# Patient Record
Sex: FEMALE | Race: WHITE
[De-identification: names, ages, dates, MRNs, and addresses within clinical notes are randomized per-mention and may not be internally consistent; named-entity substitution may affect disease eponyms.]

---

## 2019-01-18 ENCOUNTER — HOSPITAL ENCOUNTER (EMERGENCY)
Dept: HOSPITAL 89 - ER | Age: 34
Discharge: HOME | End: 2019-01-18
Payer: MEDICAID

## 2019-01-18 VITALS — SYSTOLIC BLOOD PRESSURE: 97 MMHG | DIASTOLIC BLOOD PRESSURE: 69 MMHG

## 2019-01-18 DIAGNOSIS — R94.5: ICD-10-CM

## 2019-01-18 DIAGNOSIS — R19.7: ICD-10-CM

## 2019-01-18 DIAGNOSIS — R10.11: Primary | ICD-10-CM

## 2019-01-18 DIAGNOSIS — R10.31: ICD-10-CM

## 2019-01-18 LAB — PLATELET COUNT, AUTOMATED: 428 K/UL (ref 150–450)

## 2019-01-18 PROCEDURE — 84703 CHORIONIC GONADOTROPIN ASSAY: CPT

## 2019-01-18 PROCEDURE — 82374 ASSAY BLOOD CARBON DIOXIDE: CPT

## 2019-01-18 PROCEDURE — 96374 THER/PROPH/DIAG INJ IV PUSH: CPT

## 2019-01-18 PROCEDURE — 82565 ASSAY OF CREATININE: CPT

## 2019-01-18 PROCEDURE — 81001 URINALYSIS AUTO W/SCOPE: CPT

## 2019-01-18 PROCEDURE — 83690 ASSAY OF LIPASE: CPT

## 2019-01-18 PROCEDURE — 82247 BILIRUBIN TOTAL: CPT

## 2019-01-18 PROCEDURE — 84075 ASSAY ALKALINE PHOSPHATASE: CPT

## 2019-01-18 PROCEDURE — 76705 ECHO EXAM OF ABDOMEN: CPT

## 2019-01-18 PROCEDURE — 96375 TX/PRO/DX INJ NEW DRUG ADDON: CPT

## 2019-01-18 PROCEDURE — 82040 ASSAY OF SERUM ALBUMIN: CPT

## 2019-01-18 PROCEDURE — 85025 COMPLETE CBC W/AUTO DIFF WBC: CPT

## 2019-01-18 PROCEDURE — 84520 ASSAY OF UREA NITROGEN: CPT

## 2019-01-18 PROCEDURE — 74177 CT ABD & PELVIS W/CONTRAST: CPT

## 2019-01-18 PROCEDURE — 82150 ASSAY OF AMYLASE: CPT

## 2019-01-18 PROCEDURE — 84155 ASSAY OF PROTEIN SERUM: CPT

## 2019-01-18 PROCEDURE — 82310 ASSAY OF CALCIUM: CPT

## 2019-01-18 PROCEDURE — 84460 ALANINE AMINO (ALT) (SGPT): CPT

## 2019-01-18 PROCEDURE — 84295 ASSAY OF SERUM SODIUM: CPT

## 2019-01-18 PROCEDURE — 84132 ASSAY OF SERUM POTASSIUM: CPT

## 2019-01-18 PROCEDURE — 99284 EMERGENCY DEPT VISIT MOD MDM: CPT

## 2019-01-18 PROCEDURE — 83605 ASSAY OF LACTIC ACID: CPT

## 2019-01-18 PROCEDURE — 96361 HYDRATE IV INFUSION ADD-ON: CPT

## 2019-01-18 PROCEDURE — 82435 ASSAY OF BLOOD CHLORIDE: CPT

## 2019-01-18 PROCEDURE — 84450 TRANSFERASE (AST) (SGOT): CPT

## 2019-01-18 PROCEDURE — 82947 ASSAY GLUCOSE BLOOD QUANT: CPT

## 2019-01-18 NOTE — ER REPORT
History and Physical


Time Seen By MD:  18:50


Hx. of Stated Complaint:  


ABD PRESSURE X 2 DAYS, TODAY VOMITING. PT REPORTS SEEING MD IN [place] AND 


TOLD THAT HER APPENDIX IS "ENLARGED" AND TOLD TO "WATCH IT"


HPI/ROS


CHIEF COMPLAINT: Abdominal pain





HISTORY OF PRESENT ILLNESS: 33 year-old female presents to the ER complaining of


severe right upper quadrant and right lower quadrant abdominal pain.  Patient 


was seen in the ER and weak in 2 days ago.  States she had a CAT scan to 


evaluate her right lower abdominal pain.  She states that she had an enlarged 


appendix.  She was advised a clear liquid diet.  Patient notes that her pain is 


worse.  She's been having some fever and chills.  She's had no diarrhea, but 


notes severe nausea and no vomiting.  Patient denies dysuria.  Patient has a 


history of a .





REVIEW OF SYSTEMS:


Respiratory: No cough, no dyspnea.


Cardiovascular: No chest pain, no palpitations.


Gastrointestinal: As above


Musculoskeletal: No back pain.


Allergies:  


Coded Allergies:  


     mustard (Verified  Allergy, Mild, 19)


     venom-wasp (Verified  Allergy, Mild, 19)


Uncoded Allergies:  


     BEE STINGS (Allergy, Mild, 3/24/10)


Home Meds


Active Scripts


Promethazine Hcl (PROMETHAZINE HCL) 25 Mg Tablet, 25 MG PO Q4H PRN for 


NAUSEA/VOMITING, #14 TAB


   Prov:NITO PUCKETT DO         19


Tramadol Hcl (TRAMADOL HCL) 50 Mg Tablet, 1 TAB PO Q4-6H for PAIN, #12 TAB


   Prov:NITO PUCKETT DO         19


Reported Medications


Tramadol Hcl (Ultram) 50 Mg Tab, 50 MG PO Q4-6H, 0 Refills


   12


Hydrocodone Bit/Acetaminophen (Hydrocodone/Apap 7.5/500 Tb) 1 Each Tablet, 1 


EACH PO Q4-6H, 0 Refills


   12


Ibuprofen (Motrin) 400 Mg Tablet, 400 MG PO, #20 0 Refills


   12


[vitamins]   No Conflict Check, 0 Refills


   12


Reviewed Nurses Notes:  Yes


Old Medical Records Reviewed:  Yes


Hx Smoking:  No


Hx Substance Use Disorder:  No


Hx Alcohol Use:  No


Constitutional





Vital Sign - Last 24 Hours








 19





 18:30 19:00 19:01 19:16


 


Pulse 80  99 74


 


Resp 16   


 


B/P (MAP) 132/102 124/95 (105)  


 


Pulse Ox 98  96 92


 


O2 Delivery Room Air   





 19





 19:30 19:31 20:00 20:01


 


Pulse  72  81


 


B/P (MAP) 113/69 (84)  104/75 (85) 


 


Pulse Ox  90  90





 19





 20:06 20:30 20:36 20:51


 


Pulse 73  69 67


 


B/P (MAP)  103/62 (76)  


 


Pulse Ox 94  93 94





 19





 21:00 21:06 21:21 21:30


 


Pulse  70 80 


 


B/P (MAP) 114/57 (76)   96/64 (75)


 


Pulse Ox  92 92 





 19





 21:36 21:51 22:00 22:05


 


Pulse 66 62  70


 


B/P (MAP)   97/69 (78) 


 


Pulse Ox 93 88  91








Physical Exam


  Vital signs stable, pulse ox normal


General Appearance: The patient is alert, has no immediate need for airway 


protection and no current signs of toxicity.  Mild distress, slightly pale 


appearing


HEENT: Pupils equal and round no injection.  TMs normal, oropharynx are redness 


or exudate, mucous members are moist


Respiratory: Chest is non tender, lungs are clear to auscultation.


Cardiac: regular rate and rhythm


Gastrointestinal: Abdomen is soft, moderate tenderness in the right upper and 


right lower quadrant, there is some guarding in the right lower quadrant, no 


masses, bowel sounds normal.


Musculoskeletal:  Neck: Neck is supple and non tender.


Extremities have full range of motion and are non tender.


Skin: No rashes or lesions.





DIFFERENTIAL DIAGNOSIS: After history and physical exam differential diagnosis 


was considered for abdominal pain including but not limited to appendicitis, 


cholecystitis, gastritis and urinary tract infection.





Medical Decision Making


Data Points


Result Diagram:  


19





Laboratory





Hematology








Test


 19


18:52 19


19:00


 


Urine Color Yellow  


 


Urine Clarity Clear  


 


Urine pH


 5.0 pH


(4.8-9.5) 





 


Urine Specific Gravity 1.018  


 


Urine Protein


 Negative mg/dL


(NEGATIVE) 





 


Urine Glucose (UA)


 Negative mg/dL


(NEGATIVE) 





 


Urine Ketones


 Negative mg/dL


(NEGATIVE) 





 


Urine Blood


 Negative


(NEGATIVE) 





 


Urine Nitrite


 Negative


(NEGATIVE) 





 


Urine Bilirubin


 Negative


(NEGATIVE) 





 


Urine Urobilinogen


 Negative mg/dL


(0.2-1.9) 





 


Urine Leukocyte Esterase


 Negative


(NEGATIVE) 





 


Urine RBC


 1 /HPF


(0-2/HPF) 





 


Urine WBC


 <1 /HPF


(0-5/HPF) 





 


Urine Squamous Epithelial


Cells None /LPF


(</=FEW) 





 


Urine Bacteria


 Negative /HPF


(NONE-FEW) 





 


Urine Mucus


 None /HPF


(NONE-FEW) 





 


Red Blood Count


 


 4.63 M/uL


(4.17-5.56)


 


Mean Corpuscular Volume


 


 91.5 fL


(80.0-96.0)


 


Mean Corpuscular Hemoglobin


 


 31.4 pg


(26.0-33.0)


 


Mean Corpuscular Hemoglobin


Concent 


 34.3 g/dL


(32.0-36.0)


 


Red Cell Distribution Width


 


 17.7 %


(11.5-14.5)


 


Mean Platelet Volume


 


 7.8 fL


(7.2-11.1)


 


Neutrophils (%) (Auto)


 


 49.5 %


(39.4-72.5)


 


Lymphocytes (%) (Auto)


 


 40.6 %


(17.6-49.6)


 


Monocytes (%) (Auto)


 


 7.2 %


(4.1-12.4)


 


Eosinophils (%) (Auto)


 


 0.9 %


(0.4-6.7)


 


Basophils (%) (Auto)


 


 1.8 %


(0.3-1.4)


 


Nucleated RBC Relative Count


(auto) 


 0.4 /100WBC 





 


Neutrophils # (Auto)


 


 5.2 K/uL


(2.0-7.4)


 


Lymphocytes # (Auto)


 


 4.3 K/uL


(1.3-3.6)


 


Monocytes # (Auto)


 


 0.8 K/uL


(0.3-1.0)


 


Eosinophils # (Auto)


 


 0.1 K/uL


(0.0-0.5)


 


Basophils # (Auto)


 


 0.2 K/uL


(0.0-0.1)


 


Nucleated RBC Absolute Count


(auto) 


 0.04 K/uL 





 


Sodium Level


 


 138 mmol/L


(137-145)


 


Potassium Level


 


 4.0 mmol/L


(3.5-5.0)


 


Chloride Level


 


 102 mmol/L


()


 


Carbon Dioxide Level


 


 23 mmol/L


(22-31)


 


Blood Urea Nitrogen


 


 13 mg/dl


(7-18)


 


Creatinine


 


 0.70 mg/dl


(0.52-1.04)


 


Glomerular Filtration Rate


Calc 


 > 60.0 





 


Random Glucose


 


 95 mg/dl


()


 


Lactate


 


 1.8 mmol/L


(0.7-2.1)


 


Calcium Level


 


 9.6 mg/dl


(8.4-10.2)


 


Total Bilirubin


 


 0.9 mg/dl


(0.2-1.3)


 


Aspartate Amino Transf


(AST/SGOT) 


 104 U/L (0-35) 





 


Alanine Aminotransferase


(ALT/SGPT) 


 213 U/L (0-56) 





 


Alkaline Phosphatase


 


 130 U/L


(0-126)


 


Total Protein


 


 8.5 g/dl


(6.3-8.2)


 


Albumin


 


 4.6 g/dl


(3.5-5.0)


 


Amylase Level  62 U/L (0-110) 


 


Lipase


 


 177 U/L


()


 


Human Chorionic Gonadotropin,


Qual 


 Negative


(NEGATIVE)








Chemistry








Test


 19


18:52 19


19:00


 


Urine Color Yellow  


 


Urine Clarity Clear  


 


Urine pH


 5.0 pH


(4.8-9.5) 





 


Urine Specific Gravity 1.018  


 


Urine Protein


 Negative mg/dL


(NEGATIVE) 





 


Urine Glucose (UA)


 Negative mg/dL


(NEGATIVE) 





 


Urine Ketones


 Negative mg/dL


(NEGATIVE) 





 


Urine Blood


 Negative


(NEGATIVE) 





 


Urine Nitrite


 Negative


(NEGATIVE) 





 


Urine Bilirubin


 Negative


(NEGATIVE) 





 


Urine Urobilinogen


 Negative mg/dL


(0.2-1.9) 





 


Urine Leukocyte Esterase


 Negative


(NEGATIVE) 





 


Urine RBC


 1 /HPF


(0-2/HPF) 





 


Urine WBC


 <1 /HPF


(0-5/HPF) 





 


Urine Squamous Epithelial


Cells None /LPF


(</=FEW) 





 


Urine Bacteria


 Negative /HPF


(NONE-FEW) 





 


Urine Mucus


 None /HPF


(NONE-FEW) 





 


White Blood Count


 


 10.5 k/uL


(4.5-11.0)


 


Red Blood Count


 


 4.63 M/uL


(4.17-5.56)


 


Hemoglobin


 


 14.5 g/dL


(12.0-16.0)


 


Hematocrit


 


 42.3 %


(34.0-47.0)


 


Mean Corpuscular Volume


 


 91.5 fL


(80.0-96.0)


 


Mean Corpuscular Hemoglobin


 


 31.4 pg


(26.0-33.0)


 


Mean Corpuscular Hemoglobin


Concent 


 34.3 g/dL


(32.0-36.0)


 


Red Cell Distribution Width


 


 17.7 %


(11.5-14.5)


 


Platelet Count


 


 428 K/uL


(150-450)


 


Mean Platelet Volume


 


 7.8 fL


(7.2-11.1)


 


Neutrophils (%) (Auto)


 


 49.5 %


(39.4-72.5)


 


Lymphocytes (%) (Auto)


 


 40.6 %


(17.6-49.6)


 


Monocytes (%) (Auto)


 


 7.2 %


(4.1-12.4)


 


Eosinophils (%) (Auto)


 


 0.9 %


(0.4-6.7)


 


Basophils (%) (Auto)


 


 1.8 %


(0.3-1.4)


 


Nucleated RBC Relative Count


(auto) 


 0.4 /100WBC 





 


Neutrophils # (Auto)


 


 5.2 K/uL


(2.0-7.4)


 


Lymphocytes # (Auto)


 


 4.3 K/uL


(1.3-3.6)


 


Monocytes # (Auto)


 


 0.8 K/uL


(0.3-1.0)


 


Eosinophils # (Auto)


 


 0.1 K/uL


(0.0-0.5)


 


Basophils # (Auto)


 


 0.2 K/uL


(0.0-0.1)


 


Nucleated RBC Absolute Count


(auto) 


 0.04 K/uL 





 


Glomerular Filtration Rate


Calc 


 > 60.0 





 


Lactate


 


 1.8 mmol/L


(0.7-2.1)


 


Calcium Level


 


 9.6 mg/dl


(8.4-10.2)


 


Total Bilirubin


 


 0.9 mg/dl


(0.2-1.3)


 


Aspartate Amino Transf


(AST/SGOT) 


 104 U/L (0-35) 





 


Alanine Aminotransferase


(ALT/SGPT) 


 213 U/L (0-56) 





 


Alkaline Phosphatase


 


 130 U/L


(0-126)


 


Total Protein


 


 8.5 g/dl


(6.3-8.2)


 


Albumin


 


 4.6 g/dl


(3.5-5.0)


 


Amylase Level  62 U/L (0-110) 


 


Lipase


 


 177 U/L


()


 


Human Chorionic Gonadotropin,


Qual 


 Negative


(NEGATIVE)








Urinalysis








Test


 19


18:52


 


Urine Color Yellow 


 


Urine Clarity Clear 


 


Urine pH


 5.0 pH


(4.8-9.5)


 


Urine Specific Gravity 1.018 


 


Urine Protein


 Negative mg/dL


(NEGATIVE)


 


Urine Glucose (UA)


 Negative mg/dL


(NEGATIVE)


 


Urine Ketones


 Negative mg/dL


(NEGATIVE)


 


Urine Blood


 Negative


(NEGATIVE)


 


Urine Nitrite


 Negative


(NEGATIVE)


 


Urine Bilirubin


 Negative


(NEGATIVE)


 


Urine Urobilinogen


 Negative mg/dL


(0.2-1.9)


 


Urine Leukocyte Esterase


 Negative


(NEGATIVE)


 


Urine RBC


 1 /HPF


(0-2/HPF)


 


Urine WBC


 <1 /HPF


(0-5/HPF)


 


Urine Squamous Epithelial


Cells None /LPF


(</=FEW)


 


Urine Bacteria


 Negative /HPF


(NONE-FEW)


 


Urine Mucus


 None /HPF


(NONE-FEW)











EKG/Imaging


Imaging


Results:


CT scan of the abdomen and pelvis with IV contrast was obtained.  The results of


 the study are 


COMPUTED TOMOGRAPHY OF THE Abdomen and Pelvis with  CONTRAST


 


INDICATION: Abdominal pain x3 days.


 


TECHNIQUE: Contiguous axial 3.0 mm CT images were obtained through the abdomen 


and pelvis  with contrast. Coronal and sagittal reformatted images were s


ubmitted.


 


COMPARISON: None.


 


FINDINGS:


 


Lung bases: The lung bases are clear.


 


Liver and hepatic vasculature:  No focal liver lesion.


 


Gallbladder and bile ducts:  Normal


 


Spleen:  Normal


 


Pancreas:  Normal


 


Adrenals:  Normal


 


Kidneys, ureters and bladder:  No hydronephrosis or obstruction.  No stones.  


Normal-appearing bladder.  Probable small cyst of the right kidney.


 


Retroperitoneum and aorta:  Normal caliber aorta.


 


GI tract, mesentery and peritoneum: No bowel obstruction.  No free fluid or free


 air.  Normal appendix.  No findings of diverticulitis.


 


Uterus and adnexa: 1.7 cm cystic structure at the right ovary is likely an 


involuting cyst.


 


Bones and soft tissues: No acute osseous abnormality.  Small fat-containing 


paraumbilical hernia.


 


IMPRESSION:


 


1.  Normal appendix.


2.  Probable involuting 1.7 cm cyst at the right ovary.


 


The study was read by the radiologist.  I viewed the images myself on the PACS 


system.











Results:


Ultrasound of the right upper quadrant abdominal was obtained.  The results of 


the study are 


 


EXAMINATION:   Limited right upper quadrant ultrasound


 


Additional Pertinent history: Right upper quadrant pain. Elevated liver function


 tests.


 


COMPARISON STUDIES:   CT of the abdomen pelvis done earlier in the day.


 


FINDINGS:


Gallbladder: no stones, sludge, wall thickening or pericholecystic fluid. 


Negative ultrasound Goldman sign.


Liver: Normal size and echotexture. No focal normality. Liver surface is smooth.


 Portal vein is patent. No ascites.


Common duct: normal 2.7 mm.


Pancreas: No focal abnormality.


Right kidney: negative


Proximal IVC/Aorta: negative


 


IMPRESSION:


Normal right upper quadrant ultrasound.








The study was read by the radiologist.  I viewed the images myself on the PACS 


system.





ED Course/Re-evaluation


Clinical Indication for ER IV:  Hydration, IV Access


ED Course


Patient was minute to an examination room.  H&P was done.  The differential 


diagnoses was considered.  On clinical examination.  Patient has both right 


upper quadrant and right lower quadrant.  She had a CAT scan 2 days ago that 


showed an enlarged appendix.  Patient's diagnostic studies are unremarkable 


except for elevated LFTs and alkaline phosphatase suspicious for cholecystitis. 


 A CT scan of the abdomen and pelvis was unremarkable.  An ultrasound of the 


right upper quadrant showed no pathology.  Patient may have gallbladder sludge 


and may need a hiatus scan.  She is advised to follow-up with general surgery 


for endoscopy and I did scan or any other diagnostic testing.  That seems 


appropriate.  Patient was discharged home on tramadol for pain and Phenergan for


 nausea control.


Decision to Disposition Date:  2019


Decision to Disposition Time:  22:03





Depart


Departure


Latest Vital Signs





Vital Signs








  Date Time  Temp Pulse Resp B/P (MAP) Pulse Ox O2 Delivery O2 Flow Rate FiO2


 


19 22:05  70   91   


 


19 22:00    97/69 (78)    


 


19 18:30   16   Room Air  








Impression:  


   Primary Impression:  


   Abdominal pain


   Additional Impressions:  


   Diarrhea


   Abnormal LFTs


Condition:  Improved


Disposition:  HOME OR SELF-CARE


Referrals:  


JOSE ANGEL FLORES


New Scripts


Promethazine Hcl (PROMETHAZINE HCL) 25 Mg Tablet


25 MG PO Q4H PRN for NAUSEA/VOMITING, #14 TAB


   Prov: NITO PUCKETT DO         19 


Tramadol Hcl (TRAMADOL HCL) 50 Mg Tablet


1 TAB PO Q4-6H for PAIN, #12 TAB


   Prov: NITO PUCKETT DO         19


Patient Instructions:  Abdominal Pain (ED), Clear Liquid Diet (ED)





Additional Instructions:  


Follow clear liquid diet for 24-48 hours, then advance to Jeremie diet


Use Phenergan/promethazine for nausea control


Use tramadol for pain control


Follow-up with general surgery, Dr. Flores for further evaluation and 


consideration of cholecystectomy





Problem Qualifiers








   Primary Impression:  


   Abdominal pain


   Abdominal location:  upper abdomen, unspecified  Qualified Codes:  R10.10 - 


   Upper abdominal pain, unspecified


   Additional Impressions:  


   Diarrhea


   Diarrhea type:  unspecified type  Qualified Codes:  R19.7 - Diarrhea, 


   unspecified








NITO PUCKETT DO              2019 18:53

## 2019-01-18 NOTE — RADIOLOGY IMAGING REPORT
FACILITY: [facility] - [place] 

 

PATIENT NAME: Ro Molina

: 1985

MR: [id number]

V: [id number]

EXAM DATE: 

ORDERING PHYSICIAN: NITO PUCKETT

TECHNOLOGIST: 

 

[place] [facility] [place]

Patient: Ro Molina

: 1985

MRN: [id number]

Visit/Account:[id number]

Date of Sevice:  2019

 

ACCESSION #: [id number]

 

COMPUTED TOMOGRAPHY OF THE Abdomen and Pelvis with  CONTRAST

 

INDICATION: Abdominal pain x3 days.

 

TECHNIQUE: Contiguous axial 3.0 mm CT images were obtained through the abdomen and pelvis  with contr
ast. Coronal and sagittal reformatted images were submitted.

 

COMPARISON: None.

 

FINDINGS:

 

Lung bases: The lung bases are clear.

 

Liver and hepatic vasculature:  No focal liver lesion.

 

Gallbladder and bile ducts:  Normal

 

Spleen:  Normal

 

Pancreas:  Normal

 

Adrenals:  Normal

 

Kidneys, ureters and bladder:  No hydronephrosis or obstruction.  No stones.  Normal-appearing bladde
r.  Probable small cyst of the right kidney.

 

Retroperitoneum and aorta:  Normal caliber aorta.

 

GI tract, mesentery and peritoneum: No bowel obstruction.  No free fluid or free air.  Normal appendi
x.  No findings of diverticulitis.

 

Uterus and adnexa: 1.7 cm cystic structure at the right ovary is likely an involuting cyst.

 

Bones and soft tissues: No acute osseous abnormality.  Small fat-containing paraumbilical hernia.

 

IMPRESSION:

 

1.  Normal appendix.

2.  Probable involuting 1.7 cm cyst at the right ovary.

 

 

One of the following dose optimization techniques was utilized in the performance of this exam: Autom
ated exposure control; adjustment of the mA and/or kV according to the patient's size; or use of an i
terative  reconstruction technique.  Specific details can be referenced in the facility's radiology C
T exam operational policy.

 

 

Report Dictated By: Shaila Meade MD at 2019 8:13 PM

 

Report E-Signed By: Shaila Meade MD  at 2019 8:21 PM

 

[id number]

## 2019-01-18 NOTE — RADIOLOGY IMAGING REPORT
FACILITY: [facility] - [place] 

 

PATIENT NAME: Ro Molina

: 1985

MR: [id number]

V: [id number]

EXAM DATE: 

ORDERING PHYSICIAN: NITO PUCKETT

TECHNOLOGIST: 

 

Location: [facility]

Patient: Ro Molina

: 1985

MRN: [id number]

Visit/Account:[id number]

Date of Sevice:  2019

 

ACCESSION #: [id number]

 

EXAMINATION:   Limited right upper quadrant ultrasound

 

Additional Pertinent history: Right upper quadrant pain. Elevated liver function tests.

 

COMPARISON STUDIES:   CT of the abdomen pelvis done earlier in the day.

 

FINDINGS:

Gallbladder: no stones, sludge, wall thickening or pericholecystic fluid. Negative ultrasound Goldman 
sign.

Liver: Normal size and echotexture. No focal normality. Liver surface is smooth. Portal vein is paten
t. No ascites.

Common duct: normal 2.7 mm.

Pancreas: No focal abnormality.

Right kidney: negative

Proximal IVC/Aorta: negative

 

IMPRESSION:

Normal right upper quadrant ultrasound.

 

Report Dictated By: Donal Matias at 2019 9:33 PM

 

Report E-Signed By: Donal Matias  at 2019 9:36 PM

 

WSN:[id number]

## 2019-01-25 ENCOUNTER — HOSPITAL ENCOUNTER (OUTPATIENT)
Dept: HOSPITAL 89 - OR | Age: 34
Discharge: HOME | End: 2019-01-25
Attending: SURGERY
Payer: COMMERCIAL

## 2019-01-25 VITALS — BODY MASS INDEX: 31.83 KG/M2 | HEIGHT: 62 IN | WEIGHT: 173 LBS

## 2019-01-25 VITALS — SYSTOLIC BLOOD PRESSURE: 75 MMHG | DIASTOLIC BLOOD PRESSURE: 56 MMHG

## 2019-01-25 VITALS — DIASTOLIC BLOOD PRESSURE: 80 MMHG | SYSTOLIC BLOOD PRESSURE: 117 MMHG

## 2019-01-25 VITALS — SYSTOLIC BLOOD PRESSURE: 83 MMHG | DIASTOLIC BLOOD PRESSURE: 57 MMHG

## 2019-01-25 VITALS — DIASTOLIC BLOOD PRESSURE: 77 MMHG | SYSTOLIC BLOOD PRESSURE: 111 MMHG

## 2019-01-25 VITALS — DIASTOLIC BLOOD PRESSURE: 64 MMHG | SYSTOLIC BLOOD PRESSURE: 89 MMHG

## 2019-01-25 VITALS — SYSTOLIC BLOOD PRESSURE: 88 MMHG | DIASTOLIC BLOOD PRESSURE: 51 MMHG

## 2019-01-25 VITALS — SYSTOLIC BLOOD PRESSURE: 92 MMHG | DIASTOLIC BLOOD PRESSURE: 57 MMHG

## 2019-01-25 DIAGNOSIS — K29.70: Primary | ICD-10-CM

## 2019-01-25 PROCEDURE — 88344 IMHCHEM/IMCYTCHM EA MLT ANTB: CPT

## 2019-01-25 PROCEDURE — 88305 TISSUE EXAM BY PATHOLOGIST: CPT

## 2019-01-25 PROCEDURE — 43239 EGD BIOPSY SINGLE/MULTIPLE: CPT

## 2019-01-25 PROCEDURE — 87077 CULTURE AEROBIC IDENTIFY: CPT

## 2019-01-25 NOTE — NUR
1200- PT BACK FROM BATHROOM. GIVEN VERBAL AND WRITTEN DISCHARGE INSTRUCTIONS. DENIES ANY 
QUESTIONS OR CONCERNS. PT BP TAKEN ON RETURN FROM BATHROOM STABLE. IV DISCONTINUED AT THIS 
TIME. 

1205-PT ESCORTED TO ADMITTING ENTRANCE AT THIS TIME. DENIES ANY FURTHER QUESTIONS OR 
CONCERNS. PT TO AWAIT RISE. INSTRUCTED WHERE TO SEEK HELP IF NEEDED WHILE WAITING

## 2019-01-25 NOTE — SHORT(OUTPT) DISCHARGE SUMMARY
Discharge Summary


Reason for Hosp/Final Diag:  


(1) Abdominal pain


Status:  Acute


Hospital Course & Plan:  32 yo f present for egd for ruq pain and melena. she 


tolerated the procedure well and there were no complications. she will be 


discharged home when criteria met. path pending. 





Departure


Discharge to:  Home





Discharge Instructions


Home Meds


Active Scripts


Sucralfate (CARAFATE) 1 Gm Tablet, 1 GM PO Q6H, #10 CAP


   Prov:JOSE ANGEL KAUR         1/24/19


Pantoprazole Sodium (PANTOPRAZOLE SODIUM) 40 Mg Tablet.dr, 40 MG PO BID, #30 


TAB.SR


   Prov:JOSE ANGEL KAUR         1/24/19


Promethazine Hcl (PROMETHAZINE HCL) 25 Mg Tablet, 25 MG PO Q4H PRN for 


NAUSEA/VOMITING, #14 TAB


   Prov:NITO PUCKETT DO         1/18/19


Discontinued Reported Medications


Tramadol Hcl (Ultram) 50 Mg Tab, 50 MG PO Q4-6H, 0 Refills


   1/25/12


Hydrocodone Bit/Acetaminophen (Hydrocodone/Apap 7.5/500 Tb) 1 Each Tablet, 1 


EACH PO Q4-6H, 0 Refills


   1/25/12


Ibuprofen (Motrin) 400 Mg Tablet, 400 MG PO, #20 0 Refills


   1/25/12


[vitamins]   No Conflict Check, 0 Refills


   1/25/12


Discontinued Scripts


Tramadol Hcl (TRAMADOL HCL) 50 Mg Tablet, 1 TAB PO Q4-6H for PAIN, #12 TAB


   Prov:NITO PUCKETT DO         1/18/19


Diet:  Regular


Activity:  As Tolerated


Special Instructions:  


we will call you in 7-10 days with biopsy results.











JOSE ANGEL KAUR                  Jan 25, 2019 11:07

## 2019-01-25 NOTE — NUR
1140-PT ATTEMPTING TO CONTACT RIDE. RN HAS ATTEMPTED AS WELL AND FAILED. PT DENIES NAUSEA 
AND REPORTS PAIN TOLERABLE.BP LOW. NOTED AT THIS TIME

## 2019-01-25 NOTE — NUR
1106-PT ARRIVES TO SD FROM OR ON CART, SBAR REPOT BEDSIDE. PT NOT RESPONSIVE T STIMULI AT 
THIS TIME. WILL CONTINUE TO MONITOR

1116-PT WAKES SPONT AT THIS  TIME. TRAILED ON ROOM AIR.

## 2019-01-25 NOTE — NUR
1147-PT SITTING AND STANDING. DENIES DIZZINESS AND LIGHT HEADEDNESS. REQUESTS TO GET UP AND 
USE RESTROOM. HYPOTENSION NOTED AA THIS TIME. PT ACCOMPANIED TO BATHROOM AND INSTRUCTED AND 
SAFETY MEASURES. OTHER WISE VSS. PT TO BATHROOM AT THIS TIME

## 2019-02-15 ENCOUNTER — HOSPITAL ENCOUNTER (OUTPATIENT)
Dept: HOSPITAL 89 - NUC | Age: 34
End: 2019-02-15
Attending: SURGERY
Payer: COMMERCIAL

## 2019-02-15 DIAGNOSIS — R19.7: ICD-10-CM

## 2019-02-15 DIAGNOSIS — R10.9: Primary | ICD-10-CM

## 2019-02-15 PROCEDURE — 78226 HEPATOBILIARY SYSTEM IMAGING: CPT

## 2019-02-15 NOTE — RADIOLOGY IMAGING REPORT
FACILITY: [facility] - [place] 

 

PATIENT NAME: Ro Molina

: 1985

MR: [id number]

V: [id number]

EXAM DATE: 

ORDERING PHYSICIAN: JOSE ANGEL KAUR

TECHNOLOGIST: 

 

Location: [facility]

Patient: Ro Molina

: 1985

MRN: [id number]

Visit/Account:[id number]

Date of Sevice:  2/15/2019

 

ACCESSION #: [id number]

 

NM HIDA SCAN

 

abdominal pain

 

 

TECHNIQUE:  5.6 mCi Tc99M labeled  Choletec was injected intravenously. A series of anterior gamma ca
sue images were obtained of the abdomen. 3.2 micrograms of CCK was also administered for this study.


 

COMPARISON STUDIES: Abdominal ultrasound 2019 which was negative for cholelithiasis

FINDINGS:

 

Liver uptake and excretion: There is prompt uniform hepatic uptake and prompt excretion into the bili
david system.

 

 

 

Time to visualization:

 

   Bile ducts:  6 minutes.

 

   Gallbladder: 9 minutes.

 

   Free spill into the duodenum: 7 minutes.

 

Duodenal- gastric reflux / extravasation:  None.

 

 

 

Calculated gallbladder ejection fraction:  94%

 

IMPRESSION:

 

Normal study.  No evidence of obstruction or acalculus cholecystitis.

 

Report Dictated By: Diego Leo MD at 2/15/2019 2:54 PM

 

Report E-Signed By: Diego Leo MD  at 2/15/2019 3:08 PM

 

WSN:[id number]

## 2019-02-20 ENCOUNTER — HOSPITAL ENCOUNTER (EMERGENCY)
Dept: HOSPITAL 89 - ER | Age: 34
Discharge: HOME | End: 2019-02-20
Payer: COMMERCIAL

## 2019-02-20 VITALS — DIASTOLIC BLOOD PRESSURE: 77 MMHG | SYSTOLIC BLOOD PRESSURE: 102 MMHG

## 2019-02-20 DIAGNOSIS — B02.9: Primary | ICD-10-CM

## 2019-02-20 PROCEDURE — 99283 EMERGENCY DEPT VISIT LOW MDM: CPT

## 2019-02-20 NOTE — ER REPORT
History and Physical


Time Seen By MD:  04:29


HPI/ROS


CHIEF COMPLAINT: Painful rash left arm





HISTORY OF PRESENT ILLNESS: 33 year-old female who several days ago underwent a 


hiatus scan for right upper quadrant abdominal pain.  She's also had a recent 


endoscopy.  He is being followed by Dr. Flores.  Patient notes arm pain for sev


eral days in her left arm.  Patient has now broken out with a vesicular rash in 


a dermatomal distribution on her left upper extremity.  Patient had an IV 


started in her left axillary arm and had radio nucleotide infused for a HIDA 


scan.


Allergies:  


Coded Allergies:  


     mustard (Verified  Allergy, Mild, 2/20/19)


     venom-wasp (Verified  Allergy, Mild, 2/20/19)


Uncoded Allergies:  


     BEE STINGS (Allergy, Mild, 3/24/10)


Home Meds


Active Scripts


Oxycodone Hcl/Acetaminophen (PERCOCET 5-325 MG TABLET) 1 Each Tablet, 1 EACH PO 


Q4-6H PRN for PAIN, #10


   Prov:NITO PUCKETT DO         2/20/19


Valacyclovir Hcl (VALTREX) 1,000 Mg Tablet, 1000 MG PO BID, #14


   Prov:NITO PUCKETT DO         2/20/19


Sucralfate (CARAFATE) 1 Gm Tablet, 1 GM PO Q6H, #10 CAP


   Prov:JOSE ANGEL FLORES         1/24/19


Pantoprazole Sodium (PANTOPRAZOLE SODIUM) 40 Mg Tablet.dr, 40 MG PO BID, #30 


TAB.SR


   Prov:JOSE ANGEL FLORES         1/24/19


Promethazine Hcl (PROMETHAZINE HCL) 25 Mg Tablet, 25 MG PO Q4H PRN for 


NAUSEA/VOMITING, #14 TAB


   Prov:NITO PUCKETT DO         1/18/19


Reviewed Nurses Notes:  Yes


Old Medical Records Reviewed:  Yes


Hx Smoking:  No


Smoking Status:  Never Smoker


Hx Substance Use Disorder:  No


Hx Alcohol Use:  No


Constitutional





Vital Sign - Last 24 Hours








 2/20/19 2/20/19 2/20/19





 04:30 04:30 05:00


 


Temp  98.0 


 


Pulse  81 


 


Resp  16 


 


B/P (MAP) 129/86 (100) 129/86 102/77 (85)


 


Pulse Ox  93 


 


O2 Delivery  Room Air 








Physical Exam


General appearance: Alert no distress.


Respiratory: Chest is non tender, lungs are clear to auscultation.


Cardiac: Regular rate and rhythm 


Extremity: Examination of the left upper extremity reveals a dermatomal 


vesicular rash in the T1 C8 distribution





DIFFERENTIAL DIAGNOSIS: After history and physical exam differential diagnosis 


was considered for contact dermatitis, allergic reaction to hiatus scan 


contrast, herpes zoster/shingles





Medical Decision Making


ED Course/Re-evaluation


ED Course


Patient was admitted to an examination room.  H&P was done.  The differential 


diagnosis was considered.  On clinical examination.  Patient has a dermatomal 


vesicular rash consistent with shingles.  Clinical presentation is consistent 


with shingles.  She'll be treated with Valtrex 1000 mg and Percocet for pain.  


Patient advised not to take her tramadol while taking the Percocet.  Patient 


advised to follow up with primary care doctor for Dr Miranda or Honorio if 


unimproved in 3-5 days.


Decision to Disposition Date:  Feb 20, 2019


Decision to Disposition Time:  04:41





Depart


Departure


Latest Vital Signs





Vital Signs








  Date Time  Temp Pulse Resp B/P (MAP) Pulse Ox O2 Delivery O2 Flow Rate FiO2


 


2/20/19 05:00    102/77 (85)    


 


2/20/19 04:30 98.0 81 16  93 Room Air  








Impression:  


   Primary Impression:  


   Shingles outbreak


Condition:  Improved


Disposition:  HOME OR SELF-CARE


Referrals:  


CESARIO MIRANDA MD, FARRUKH MD


New Scripts


Oxycodone Hcl/Acetaminophen (PERCOCET 5-325 MG TABLET) 1 Each Tablet


1 EACH PO Q4-6H PRN for PAIN, #10


   Prov: NITO PUCKETT DO         2/20/19 


Valacyclovir Hcl (VALTREX) 1,000 Mg Tablet


1000 MG PO BID, #14


   Prov: NITO PUCKETT DO         2/20/19


Patient Instructions:  Shingles (ED)





Additional Instructions:  


Follow-up with primary care if unimproved in 3-5 days.





Problem Qualifiers








   Primary Impression:  


   Shingles outbreak


   Herpes zoster complications:  without complications  Qualified Codes:  B02.9 


   - Zoster without complications








NITO PUCKETT DO              Feb 20, 2019 04:30

## 2019-03-01 ENCOUNTER — HOSPITAL ENCOUNTER (OUTPATIENT)
Dept: HOSPITAL 89 - LAB | Age: 34
End: 2019-03-01
Attending: INTERNAL MEDICINE
Payer: COMMERCIAL

## 2019-03-01 DIAGNOSIS — R94.5: Primary | ICD-10-CM

## 2019-03-01 DIAGNOSIS — F41.8: ICD-10-CM

## 2019-03-01 LAB — LDLC SERPL-MCNC: 61 MG/DL

## 2019-03-01 PROCEDURE — 86256 FLUORESCENT ANTIBODY TITER: CPT

## 2019-03-01 PROCEDURE — 84075 ASSAY ALKALINE PHOSPHATASE: CPT

## 2019-03-01 PROCEDURE — 82247 BILIRUBIN TOTAL: CPT

## 2019-03-01 PROCEDURE — 82728 ASSAY OF FERRITIN: CPT

## 2019-03-01 PROCEDURE — 82465 ASSAY BLD/SERUM CHOLESTEROL: CPT

## 2019-03-01 PROCEDURE — 83516 IMMUNOASSAY NONANTIBODY: CPT

## 2019-03-01 PROCEDURE — 36415 COLL VENOUS BLD VENIPUNCTURE: CPT

## 2019-03-01 PROCEDURE — 84478 ASSAY OF TRIGLYCERIDES: CPT

## 2019-03-01 PROCEDURE — 83718 ASSAY OF LIPOPROTEIN: CPT

## 2019-03-01 PROCEDURE — 82607 VITAMIN B-12: CPT

## 2019-03-01 PROCEDURE — 83550 IRON BINDING TEST: CPT

## 2019-03-01 PROCEDURE — 86140 C-REACTIVE PROTEIN: CPT

## 2019-03-01 PROCEDURE — 82248 BILIRUBIN DIRECT: CPT

## 2019-03-01 PROCEDURE — 84460 ALANINE AMINO (ALT) (SGPT): CPT

## 2019-03-01 PROCEDURE — 82040 ASSAY OF SERUM ALBUMIN: CPT

## 2019-03-01 PROCEDURE — 84155 ASSAY OF PROTEIN SERUM: CPT

## 2019-03-01 PROCEDURE — 83540 ASSAY OF IRON: CPT

## 2019-03-01 PROCEDURE — 82103 ALPHA-1-ANTITRYPSIN TOTAL: CPT

## 2019-03-01 PROCEDURE — 82390 ASSAY OF CERULOPLASMIN: CPT

## 2019-03-01 PROCEDURE — 84443 ASSAY THYROID STIM HORMONE: CPT

## 2019-03-01 PROCEDURE — 86376 MICROSOMAL ANTIBODY EACH: CPT

## 2019-03-01 PROCEDURE — 82784 ASSAY IGA/IGD/IGG/IGM EACH: CPT

## 2019-03-01 PROCEDURE — 84450 TRANSFERASE (AST) (SGOT): CPT

## 2019-06-11 ENCOUNTER — HOSPITAL ENCOUNTER (EMERGENCY)
Dept: HOSPITAL 89 - ER | Age: 34
Discharge: HOME | End: 2019-06-11
Payer: COMMERCIAL

## 2019-06-11 VITALS — SYSTOLIC BLOOD PRESSURE: 123 MMHG | DIASTOLIC BLOOD PRESSURE: 89 MMHG

## 2019-06-11 DIAGNOSIS — J06.9: Primary | ICD-10-CM

## 2019-06-11 PROCEDURE — 99283 EMERGENCY DEPT VISIT LOW MDM: CPT

## 2019-06-11 PROCEDURE — 87502 INFLUENZA DNA AMP PROBE: CPT

## 2019-06-11 PROCEDURE — 94640 AIRWAY INHALATION TREATMENT: CPT

## 2019-06-11 NOTE — RADIOLOGY IMAGING REPORT
[place] [facility] [place] 

 

PATIENT NAME: Ro Molina

: 1985

MR: [id number]

V: [id number]

EXAM DATE: 

ORDERING PHYSICIAN: SHAWNA NUNEZ

TECHNOLOGIST: 

 

Location: [facility]

Patient: Ro Molina

: 1985

MRN: [id number]

Visit/Account:[id number]

Date of Sevice:  2019

 

ACCESSION #: [id number]

 

TWO VIEW CHEST 2019 9:25 PM.

 

INDICATION: Cough, chest pain, shortness of breath.

 

COMPARISON: 10/15/2007.

 

FINDINGS: Lungs are well-expanded.  The lungs are clear.  No pneumothorax or pleural effusion.  Heart
 size is normal.  Mild S-shaped curvature of the thoracolumbar spine.

 

IMPRESSION: No acute abnormality.

 

Report Dictated By: Josesito Romano MD at 2019 10:27 PM

 

Report E-Signed By: Josesito Romano MD  at 2019 10:28 PM

 

WSN:[id number]

## 2019-06-11 NOTE — ER REPORT
History and Physical


Time Seen By MD:  21:06


HPI/ROS


CHIEF COMPLAINT: congestion, trouble breathing





HISTORY OF PRESENT ILLNESS: This is a 33 year old female. She has been sick with


congestion for about 6 days. nasal congestion, post-nasal drainage, ear 


fullness, sore throat. Now with a cough for a couple of days. Several co-workers


with strep. No fevers. Had tried mucinex, then switched to a cold medicine, then


switched to an over the counter allergy medicine thinking it might be allergies.


Cough is non-productive. Has been prescribed albuterol inhalers in the past for 


upper respiratory infections. Chest tight, but no pain. No nausea or vomiting. 


Poor appetite, but drinking some extra fluids.


Allergies:  


Coded Allergies:  


     mustard (Verified  Allergy, Mild, 6/11/19)


     venom-wasp (Verified  Allergy, Mild, 6/11/19)


Uncoded Allergies:  


     BEE STINGS (Allergy, Mild, 3/24/10)


Home Meds


Active Scripts


Benzonatate (BENZONATATE) 100 Mg Capsule, 100 MG PO TID PRN for COUGH, #15 CAP 0


Refills


   Prov:SHAWNA NUNEZ MD         6/11/19


Discontinued Scripts


Citalopram Hydrobromide (CITALOPRAM HBR) 10 Mg Tablet, 10 MG PO QDAY, #30 TAB


   Prov:CESARIO LOPEZ MD         2/27/19


Hydrocodone Bit/Acetaminophen (HYDROCODON-ACETAMINOPHEN 5-325) 1 Each Tablet, 1 


EACH PO Q4H for PAIN, #20 TAB


   Prov:CESARIO LOPEZ MD         2/27/19


Pantoprazole Sodium (PANTOPRAZOLE SODIUM) 40 Mg Tablet., 40 MG PO BID, #30 


TAB.SR


   Prov:JOSE ANGEL KAUR         1/24/19


Reviewed Nurses Notes:  Yes


Hx Smoking:  No


Smoking Status:  Never Smoker


Exposure to Second Hand Smoke?:  Yes (parent and  smokes)


Hx Substance Use Disorder:  No


Hx Alcohol Use:  No


Constitutional





Vital Sign - Last 24 Hours








 6/11/19 6/11/19 6/11/19 6/11/19





 21:03 21:05 21:30 21:30


 


Temp  98.8  


 


Pulse  73  71


 


Resp  16  16


 


B/P (MAP) 125/87 (100) 125/87  


 


Pulse Ox  94 96 


 


O2 Delivery  Room Air Room Air 


 


    





 6/11/19 6/11/19 6/11/19 6/11/19





 21:30 21:37 22:00 22:30


 


Pulse 71 76 92 80


 


Resp  16  


 


B/P (MAP) 118/84 (95)  122/77 (92) 123/89 (100)


 


Pulse Ox 95  93 93








Physical Exam


   General Appearance: The patient is alert. No acute distress.


Eyes: Pupils are equal, round. Reactive to light. No pallor, injection or 


icterus. Extraocular movements are intact.


ENT: Mucous membranes are moist. Normal oral mucosa. Posterior oropharynx with 


post nasal drainage and erythema. Nasal mucosa edematous, but not inflamed. Ear 


canals normal. Bilateral effusions in the ears, no redness. 


Neck: Supple and non tender. Has shotty anterior cervical lymphadenopathy.


Respiratory: Lungs with diffuse rhonchi, no focal changes. No wheezing noted. 


There are no retractions or accessory muscle use.


Cardiovascular: Regular rate and rhythm. No murmurs, gallops or rubs. Normal 


capillary refill. 


Gastrointestinal:  Abdomen is soft and non tender. Nondistended. Normal active 


bowel sounds.


Neurological: Alert and oriented x3. Cranial nerves II through XII show no acute


deficits on my exam. No focal neurologic deficits


Skin: Warm and dry. No rashes.


Musculoskeletal: Extremities are nontender. No tenderness in palpation of the 


cervical, thoracic and lumbar spine.





DIFFERENTIAL DIAGNOSIS: After history and physical exam, differential diagnosis 


was considered for patient with shortness of breath, cough and upper S2 symptoms


indicating likely viral syndrome. Looking influenza, chest x-ray, and trial of 


an albuterol nebulizer given her past history.





Medical Decision Making


Data Points


Laboratory





Hematology








Test


 6/11/19


21:18


 


Influenza Virus Type A (PCR)


 Negative


(NEGATIVE)


 


Influenza Virus Type B (PCR)


 Negative


(NEGATIVE)








Chemistry








Test


 6/11/19


21:18


 


Influenza Virus Type A (PCR)


 Negative


(NEGATIVE)


 


Influenza Virus Type B (PCR)


 Negative


(NEGATIVE)











EKG/Imaging


Imaging


TWO VIEW CHEST 6/11/2019 9:25 PM.


 


INDICATION: Cough, chest pain, shortness of breath.


 


COMPARISON: 10/15/2007.


 


FINDINGS: Lungs are well-expanded.  The lungs are clear.  No pneumothorax or 


pleural effusion.  Heart size is normal.  Mild S-shaped curvature of the 


thoracolumbar spine.


 


IMPRESSION: No acute abnormality.


 


Report Dictated By: Josesito Romano MD at 6/11/2019 10:27 PM





ED Course/Re-evaluation


ED Course


Negative influenza. Negative chest x-ray. Some improvement with Albuterol 


nebulizer. Upper respiratory infection, instructions reviewed with the patient.


Decision to Disposition Date:  Jun 11, 2019


Decision to Disposition Time:  22:57





Depart


Departure


Latest Vital Signs





Vital Signs








  Date Time  Temp Pulse Resp B/P (MAP) Pulse Ox O2 Delivery O2 Flow Rate FiO2


 


6/11/19 22:30  80  123/89 (100) 93   


 


6/11/19 21:37   16     


 


6/11/19 21:30      Room Air  


 


6/11/19 21:05 98.8       








Impression:  


   Primary Impression:  


   Upper respiratory infection


Condition:  Improved


Disposition:  HOME OR SELF-CARE


Referrals:  


CESARIO LOPEZ MD (PCP)


New Scripts


Benzonatate (BENZONATATE) 100 Mg Capsule


100 MG PO TID PRN for COUGH, #15 CAP 0 Refills


   Prov: SHAWNA NUNEZ MD         6/11/19


Patient Instructions:  Upper Respiratory Infection (ED)





Additional Instructions:  


Rest and increase fluid intake.


You can keep taking Claritin. Would recommend getting the one with a 


decongestant to use twice a day.


Take Guiafenesin (Mucinex) extended release twice a day.


Take Tessalon Perles (Benzonatate) 100mg capsules every 8 hours as needed for 


cough.


Albuterol inhaler, 2 inhalations every 4 hours as needed for shortness of 


breath.





Problem Qualifiers








   Primary Impression:  


   Upper respiratory infection


   URI type:  unspecified viral URI  Qualified Codes:  J06.9 - Acute upper 


   respiratory infection, unspecified








SHAWNA NUNEZ MD             Jun 11, 2019 21:00